# Patient Record
Sex: MALE | Race: BLACK OR AFRICAN AMERICAN | NOT HISPANIC OR LATINO | URBAN - METROPOLITAN AREA
[De-identification: names, ages, dates, MRNs, and addresses within clinical notes are randomized per-mention and may not be internally consistent; named-entity substitution may affect disease eponyms.]

---

## 2021-03-26 ENCOUNTER — IMMUNIZATION (OUTPATIENT)
Dept: PRIMARY CARE CLINIC | Facility: CLINIC | Age: 36
End: 2021-03-26

## 2021-03-26 DIAGNOSIS — Z23 NEED FOR VACCINATION: Primary | ICD-10-CM

## 2021-03-26 PROCEDURE — 91300 PR SARS-COV- 2 COVID-19 VACCINE, NO PRSV, 30MCG/0.3ML, IM: CPT | Mod: S$GLB,,, | Performed by: INTERNAL MEDICINE

## 2021-03-26 PROCEDURE — 0001A PR IMMUNIZ ADMIN, SARS-COV-2 COVID-19 VACC, 30MCG/0.3ML, 1ST DOSE: CPT | Mod: CV19,S$GLB,, | Performed by: INTERNAL MEDICINE

## 2021-03-26 PROCEDURE — 0001A PR IMMUNIZ ADMIN, SARS-COV-2 COVID-19 VACC, 30MCG/0.3ML, 1ST DOSE: ICD-10-PCS | Mod: CV19,S$GLB,, | Performed by: INTERNAL MEDICINE

## 2021-03-26 PROCEDURE — 91300 PR SARS-COV- 2 COVID-19 VACCINE, NO PRSV, 30MCG/0.3ML, IM: ICD-10-PCS | Mod: S$GLB,,, | Performed by: INTERNAL MEDICINE

## 2021-03-26 RX ADMIN — Medication 0.3 ML: at 05:03

## 2021-04-23 ENCOUNTER — IMMUNIZATION (OUTPATIENT)
Dept: PRIMARY CARE CLINIC | Facility: CLINIC | Age: 36
End: 2021-04-23

## 2021-04-23 DIAGNOSIS — Z23 NEED FOR VACCINATION: Primary | ICD-10-CM

## 2021-04-23 PROCEDURE — 91300 PR SARS-COV- 2 COVID-19 VACCINE, NO PRSV, 30MCG/0.3ML, IM: ICD-10-PCS | Mod: S$GLB,,, | Performed by: INTERNAL MEDICINE

## 2021-04-23 PROCEDURE — 91300 PR SARS-COV- 2 COVID-19 VACCINE, NO PRSV, 30MCG/0.3ML, IM: CPT | Mod: S$GLB,,, | Performed by: INTERNAL MEDICINE

## 2021-04-23 PROCEDURE — 0002A PR IMMUNIZ ADMIN, SARS-COV-2 COVID-19 VACC, 30MCG/0.3ML, 2ND DOSE: ICD-10-PCS | Mod: CV19,S$GLB,, | Performed by: INTERNAL MEDICINE

## 2021-04-23 PROCEDURE — 0002A PR IMMUNIZ ADMIN, SARS-COV-2 COVID-19 VACC, 30MCG/0.3ML, 2ND DOSE: CPT | Mod: CV19,S$GLB,, | Performed by: INTERNAL MEDICINE

## 2021-04-23 RX ADMIN — Medication 0.3 ML: at 02:04

## 2022-01-12 ENCOUNTER — HOSPITAL ENCOUNTER (EMERGENCY)
Facility: OTHER | Age: 37
Discharge: HOME | End: 2022-01-12
Attending: EMERGENCY MEDICINE
Payer: MEDICAID

## 2022-01-12 VITALS
RESPIRATION RATE: 18 BRPM | OXYGEN SATURATION: 100 % | DIASTOLIC BLOOD PRESSURE: 74 MMHG | WEIGHT: 145 LBS | HEIGHT: 72 IN | SYSTOLIC BLOOD PRESSURE: 155 MMHG | BODY MASS INDEX: 19.64 KG/M2 | TEMPERATURE: 98 F | HEART RATE: 70 BPM

## 2022-01-12 DIAGNOSIS — S89.92XA INJURY OF LEFT KNEE, INITIAL ENCOUNTER: ICD-10-CM

## 2022-01-12 PROBLEM — S89.90XA KNEE INJURY: Status: ACTIVE | Noted: 2022-01-12

## 2022-01-12 PROCEDURE — 99283 EMERGENCY DEPT VISIT LOW MDM: CPT | Mod: 25

## 2022-01-12 PROCEDURE — 25000003 PHARM REV CODE 250: Performed by: EMERGENCY MEDICINE

## 2022-01-12 RX ORDER — ACETAMINOPHEN 500 MG
1000 TABLET ORAL
Status: COMPLETED | OUTPATIENT
Start: 2022-01-12 | End: 2022-01-12

## 2022-01-12 RX ADMIN — ACETAMINOPHEN 1000 MG: 500 TABLET, FILM COATED ORAL at 11:01

## 2022-01-13 NOTE — ED TRIAGE NOTES
Addended by: TITI VILLALOBOS on: 3/13/2020 06:54 AM     Modules accepted: Orders     Pt c/o Left knee pain/injury. Pain x 3wks. Pt states playing basketball and caused increase in pain

## 2022-01-13 NOTE — ED PROVIDER NOTES
Encounter Date: 1/12/2022    SCRIBE #1 NOTE: I, April Esparza, am scribing for, and in the presence of, Binta Mayer MD.   SCRIBE #2 NOTE: I, Anne Jeb, am scribing for, and in the presence of, Binta Mayer MD.     History     Chief Complaint   Patient presents with    Knee Pain     Pt c/o Left knee pain x3wks     Time seen by provider: 10:45 PM    This is a 36 y.o. male who presents with complaint of left knee pain x 3 weeks. The patient states that this pain began after he was squatting down during his job and felt his knee twist into an uncomfortable position. He reports that his knee feels fine when walking straight. He states that when he was playing basketball earlier today he felt his inner left knee once again twist into an uncomfortable position.The patient was in pain when attempting to bend the left knee. He has not taken any medication for the pain.This is the extent of the patient's complaints at this time.    The history is provided by the patient.     Review of patient's allergies indicates:  No Known Allergies  History reviewed. No pertinent past medical history.  History reviewed. No pertinent surgical history.  History reviewed. No pertinent family history.  Social History     Tobacco Use    Smoking status: Current Every Day Smoker     Packs/day: 0.50    Smokeless tobacco: Never Used   Substance Use Topics    Alcohol use: Never    Drug use: Yes     Frequency: 7.0 times per week     Types: Marijuana     Review of Systems   Constitutional: Negative for activity change, appetite change, chills, diaphoresis and fever.   HENT: Negative for congestion, sore throat and trouble swallowing.    Eyes: Negative for photophobia and visual disturbance.   Respiratory: Negative for cough, chest tightness and shortness of breath.    Cardiovascular: Negative for chest pain.   Gastrointestinal: Negative for abdominal pain, nausea and vomiting.   Endocrine: Negative for polydipsia and polyuria.    Genitourinary: Negative for difficulty urinating and flank pain.   Musculoskeletal: Negative for back pain and neck pain.        Positive for left knee pain.   Skin: Negative for rash.   Neurological: Negative for weakness and headaches.   Psychiatric/Behavioral: Negative for confusion.       Physical Exam     Initial Vitals [01/12/22 2208]   BP Pulse Resp Temp SpO2   (!) 155/74 70 18 97.9 °F (36.6 °C) 100 %      MAP       --         Physical Exam    Nursing note and vitals reviewed.  Constitutional: He appears well-developed. He is cooperative.   HENT:   Head: Atraumatic.   Eyes: Conjunctivae and lids are normal.   Neck:   Normal range of motion.  Cardiovascular: Normal rate.   Musculoskeletal:      Cervical back: Normal range of motion.      Comments: Crepitance with extension of the left knee. Tenderness to proximal tibia on medial joint line. No joint laxity.      Neurological: He is alert.   Skin: No rash noted.   Psychiatric: He has a normal mood and affect. His speech is normal and behavior is normal.         ED Course   Procedures  Labs Reviewed - No data to display       Imaging Results          X-Ray Knee 1 or 2 View Left (Final result)  Result time 01/12/22 23:03:01    Final result by Gurinder Nieto MD (01/12/22 23:03:01)                 Impression:      No acute osseous abnormality identified.      Electronically signed by: Gurinder Nieto MD  Date:    01/12/2022  Time:    23:03             Narrative:    EXAMINATION:  XR KNEE 1 OR 2 VIEW LEFT    CLINICAL HISTORY:  strain/pain;    TECHNIQUE:  AP, lateral views of the left knee were performed.    COMPARISON:  None    FINDINGS:  No evidence of acute displaced fracture, dislocation, or osseous destructive process.  Joint spaces are preserved.  No significant suprapatellar joint effusion.                              X-Rays:   Independently Interpreted Readings:   Other Readings:  Left knee X-ray: Normal alignment. No obvious fracture.    Medications    acetaminophen tablet 1,000 mg (1,000 mg Oral Given 1/12/22 2197)     Medical Decision Making:   History:   Old Medical Records: I decided to obtain old medical records.  Independently Interpreted Test(s):   I have ordered and independently interpreted X-rays - see prior notes.  Clinical Tests:   Radiological Study: Ordered and Reviewed          Scribe Attestation:   Scribe #1: I performed the above scribed service and the documentation accurately describes the services I performed. I attest to the accuracy of the note.  Scribe #2: I performed the above scribed service and the documentation accurately describes the services I performed. I attest to the accuracy of the note.        ED Course as of 01/13/22 0001   Wed Jan 12, 2022   2258 35 yo M here sp recent injury to the L knee, now pain when twisting. Exam with tenderness to medial joint line, crepitance on extension, no joint laxity on varus/valgus stress. Discussed likely ligamentous/menisceal injury with need for immobilization and fu for MRI imaging.    [DM]      ED Course User Index  [DM] Binta Mayer MD          Physician Attestation for Scribe: I, Moreno, reviewed documentation as scribed in my presence, which is both accurate and complete.       Clinical Impression:   Final diagnoses:  [S89.92XA] Injury of left knee, initial encounter          ED Disposition Condition    Discharge Stable        ED Prescriptions     None        Follow-up Information     Follow up With Specialties Details Why Contact Info    [facility] Orthopedic Surgery Schedule an appointment as soon as possible for a visit   [address] [place], [place] [id number] [phone]             Binta Mayer MD  01/13/22 0001

## 2022-02-15 ENCOUNTER — OFFICE VISIT (OUTPATIENT)
Dept: ORTHOPEDICS | Facility: CLINIC | Age: 37
End: 2022-02-15
Payer: MEDICAID

## 2022-02-15 VITALS
DIASTOLIC BLOOD PRESSURE: 76 MMHG | HEIGHT: 72 IN | WEIGHT: 146.25 LBS | SYSTOLIC BLOOD PRESSURE: 120 MMHG | HEART RATE: 60 BPM | BODY MASS INDEX: 19.81 KG/M2

## 2022-02-15 DIAGNOSIS — S89.92XA INJURY OF LEFT KNEE, INITIAL ENCOUNTER: ICD-10-CM

## 2022-02-15 DIAGNOSIS — M62.81 QUADRICEPS WEAKNESS: Primary | ICD-10-CM

## 2022-02-15 DIAGNOSIS — M23.92 INTERNAL DERANGEMENT OF LEFT KNEE: ICD-10-CM

## 2022-02-15 PROCEDURE — 99203 PR OFFICE/OUTPT VISIT, NEW, LEVL III, 30-44 MIN: ICD-10-PCS | Mod: S$PBB,,, | Performed by: PHYSICIAN ASSISTANT

## 2022-02-15 PROCEDURE — 1160F RVW MEDS BY RX/DR IN RCRD: CPT | Mod: CPTII,,, | Performed by: PHYSICIAN ASSISTANT

## 2022-02-15 PROCEDURE — 1160F PR REVIEW ALL MEDS BY PRESCRIBER/CLIN PHARMACIST DOCUMENTED: ICD-10-PCS | Mod: CPTII,,, | Performed by: PHYSICIAN ASSISTANT

## 2022-02-15 PROCEDURE — 3008F PR BODY MASS INDEX (BMI) DOCUMENTED: ICD-10-PCS | Mod: CPTII,,, | Performed by: PHYSICIAN ASSISTANT

## 2022-02-15 PROCEDURE — 99203 OFFICE O/P NEW LOW 30 MIN: CPT | Mod: S$PBB,,, | Performed by: PHYSICIAN ASSISTANT

## 2022-02-15 PROCEDURE — 99999 PR PBB SHADOW E&M-EST. PATIENT-LVL IV: ICD-10-PCS | Mod: PBBFAC,,, | Performed by: PHYSICIAN ASSISTANT

## 2022-02-15 PROCEDURE — 1159F MED LIST DOCD IN RCRD: CPT | Mod: CPTII,,, | Performed by: PHYSICIAN ASSISTANT

## 2022-02-15 PROCEDURE — 99214 OFFICE O/P EST MOD 30 MIN: CPT | Mod: PBBFAC,PN | Performed by: PHYSICIAN ASSISTANT

## 2022-02-15 PROCEDURE — 1159F PR MEDICATION LIST DOCUMENTED IN MEDICAL RECORD: ICD-10-PCS | Mod: CPTII,,, | Performed by: PHYSICIAN ASSISTANT

## 2022-02-15 PROCEDURE — 3078F DIAST BP <80 MM HG: CPT | Mod: CPTII,,, | Performed by: PHYSICIAN ASSISTANT

## 2022-02-15 PROCEDURE — 99999 PR PBB SHADOW E&M-EST. PATIENT-LVL IV: CPT | Mod: PBBFAC,,, | Performed by: PHYSICIAN ASSISTANT

## 2022-02-15 PROCEDURE — 3008F BODY MASS INDEX DOCD: CPT | Mod: CPTII,,, | Performed by: PHYSICIAN ASSISTANT

## 2022-02-15 PROCEDURE — 3078F PR MOST RECENT DIASTOLIC BLOOD PRESSURE < 80 MM HG: ICD-10-PCS | Mod: CPTII,,, | Performed by: PHYSICIAN ASSISTANT

## 2022-02-15 PROCEDURE — 3074F SYST BP LT 130 MM HG: CPT | Mod: CPTII,,, | Performed by: PHYSICIAN ASSISTANT

## 2022-02-15 PROCEDURE — 3074F PR MOST RECENT SYSTOLIC BLOOD PRESSURE < 130 MM HG: ICD-10-PCS | Mod: CPTII,,, | Performed by: PHYSICIAN ASSISTANT

## 2022-02-15 RX ORDER — GUAIFENESIN 600 MG/1
1200 TABLET, EXTENDED RELEASE ORAL
COMMUNITY
Start: 2021-07-21

## 2022-02-15 RX ORDER — IBUPROFEN 800 MG/1
800 TABLET ORAL 3 TIMES DAILY
COMMUNITY
Start: 2022-01-26

## 2022-02-15 RX ORDER — FLUTICASONE PROPIONATE 50 MCG
1 SPRAY, SUSPENSION (ML) NASAL
COMMUNITY
Start: 2021-07-21 | End: 2022-07-21

## 2022-02-15 NOTE — PROGRESS NOTES
Subjective:      Patient ID: Vish Sun is a 36 y.o. male.    Chief Complaint: Pain of the Left Knee      35yo male presents with 3 mon history of left knee pain. States he was working in a manhole. He went to squat while cleaning the manhole and his knee buckled on him. He re injured the knee playing basketball in January of this year. His pain is medial knee. Worse with twisting and lateral movements. States his knee feels like it wants to give out. He has been to the ED multiple times for this. Given a knee immobilizer which helps. Was taking ibuprofen but was causing GI upset.       Review of Systems   Constitutional: Negative for chills and fever.   Cardiovascular: Negative for chest pain.   Respiratory: Negative for cough.    Hematologic/Lymphatic: Does not bruise/bleed easily.   Skin: Negative for poor wound healing and rash.   Musculoskeletal: Positive for joint pain, muscle weakness, myalgias and stiffness. Negative for joint swelling.   Gastrointestinal: Negative for abdominal pain.   Genitourinary: Negative for bladder incontinence.   Neurological: Negative for dizziness, loss of balance and weakness.   Psychiatric/Behavioral: Negative for altered mental status.       Review of patient's allergies indicates:  No Known Allergies     Current Outpatient Medications   Medication Sig Dispense Refill    fluticasone propionate (FLONASE) 50 mcg/actuation nasal spray 1 spray by Nasal route.      guaiFENesin (MUCINEX) 600 mg 12 hr tablet Take 1,200 mg by mouth.      ibuprofen (ADVIL,MOTRIN) 800 MG tablet Take 800 mg by mouth 3 (three) times daily.       No current facility-administered medications for this visit.        The patient's relevant past medical, surgical, and social history was reviewed in Epic.       Objective:      VITAL SIGNS: /76   Pulse 60   Ht 6' (1.829 m)   Wt 66.3 kg (146 lb 4.4 oz)   BMI 19.84 kg/m²     General    Nursing note and vitals reviewed.  Constitutional: He is oriented to  person, place, and time. He appears well-developed and well-nourished.   Neurological: He is alert and oriented to person, place, and time.     General Musculoskeletal Exam   Gait: antalgic       Right Knee Exam     Range of Motion   Extension: 5   Flexion: 120     Left Knee Exam     Tenderness   The patient tender to palpation of the medial joint line and patella.    Range of Motion   Extension: 5   Flexion: 110     Tests   Meniscus   Salud:  Medial - positive     Muscle Strength   Right Lower Extremity   Quadriceps:  4/5   Left Lower Extremity   Quadriceps:  3/5     Vascular Exam       Left Pulses  Dorsalis Pedis:      2+  Posterior Tibial:      1+         X-Ray Knee 1 or 2 View Left  Narrative: EXAMINATION:  XR KNEE 1 OR 2 VIEW LEFT    CLINICAL HISTORY:  strain/pain;    TECHNIQUE:  AP, lateral views of the left knee were performed.    COMPARISON:  None    FINDINGS:  No evidence of acute displaced fracture, dislocation, or osseous destructive process.  Joint spaces are preserved.  No significant suprapatellar joint effusion.  Impression: No acute osseous abnormality identified.    Electronically signed by: Gurinder Nieto MD  Date:    01/12/2022  Time:    23:03    I have reviewed the above radiograph and agree with the findings stated by the radiologist.         Assessment:       1. Quadriceps weakness    2. Injury of left knee, initial encounter    3. Internal derangement of left knee          Plan:         Vish was seen today for pain.    Diagnoses and all orders for this visit:    Quadriceps weakness  -     Ambulatory referral/consult to Physical/Occupational Therapy; Future  -     KNEE BRACE FOR HOME USE    Injury of left knee, initial encounter  -     Ambulatory referral/consult to Sports Medicine  -     Ambulatory referral/consult to Physical/Occupational Therapy; Future  -     KNEE BRACE FOR HOME USE    Internal derangement of left knee  -     Ambulatory referral/consult to Physical/Occupational Therapy;  Future  -     KNEE BRACE FOR HOME USE      Left knee injury, probable medial meniscus injury. Explained to the patient he will need a left knee MRI but will need to complete a course of PT first in order for the MRI to be approved. [place]. Knee brace given today. Tylenol for pain. He may call the office after 4-5 weeks of PT if no relief. Will order MRI then.   He understands.         Kerri Evangelista PA-C   02/16/2022

## 2022-02-17 ENCOUNTER — CLINICAL SUPPORT (OUTPATIENT)
Dept: REHABILITATION | Facility: HOSPITAL | Age: 37
End: 2022-02-17
Attending: PHYSICIAN ASSISTANT
Payer: MEDICAID

## 2022-02-17 DIAGNOSIS — R29.898 WEAKNESS OF LEFT LOWER EXTREMITY: ICD-10-CM

## 2022-02-17 DIAGNOSIS — M25.662 DECREASED RANGE OF MOTION (ROM) OF LEFT KNEE: ICD-10-CM

## 2022-02-17 DIAGNOSIS — M62.81 QUADRICEPS WEAKNESS: ICD-10-CM

## 2022-02-17 DIAGNOSIS — M23.92 INTERNAL DERANGEMENT OF LEFT KNEE: ICD-10-CM

## 2022-02-17 DIAGNOSIS — S89.92XA INJURY OF LEFT KNEE, INITIAL ENCOUNTER: ICD-10-CM

## 2022-02-17 PROCEDURE — 97161 PT EVAL LOW COMPLEX 20 MIN: CPT | Mod: PN | Performed by: PHYSICAL THERAPIST

## 2022-02-17 PROCEDURE — 97110 THERAPEUTIC EXERCISES: CPT | Mod: PN | Performed by: PHYSICAL THERAPIST

## 2022-02-17 NOTE — PLAN OF CARE
[facility]   Physical Therapy Initial Evaluation     Date: 2/17/2022   Name: Vish Sun  Clinic Number: [id number]    Therapy Diagnosis:   Encounter Diagnoses   Name Primary?    Injury of left knee, initial encounter     Quadriceps weakness     Internal derangement of left knee     Weakness of left lower extremity Yes    Decreased range of motion (ROM) of left knee      Referring Provider: Kerri Evangelista PA-C    Referring Provider Orders: PT eval and treat  Medical Diagnosis from Referral: Injury of left knee, initial encounter [S89.92XA], Quadriceps weakness [M62.81], Internal derangement of left knee [M23.92]  Evaluation Date: 2/17/2022  Authorization Period Expiration: 2/15/2023  Plan of Care Expiration: 5/17/2022  Progress Note Due: 4/17/2022  Visit # / Visits authorized: 1/pending   FOTO: 1/3 (2/17/2022)    Precautions: Standard     Time In: 12:30  Time Out: 13:15  Total Appointment Time (timed & untimed codes): 45 minutes    SUBJECTIVE     Date of onset: 3 months ago  History of current condition - Vish reports 3 month history of left knee pain. States he was working in a manhole, then he went to squat while cleaning the manhole and his knee buckled on him. He re-injured the knee playing basketball in January of this year. His pain is on the medial aspect of the knee, worse with twisting and lateral movements. States his knee feels like it wants to give out. Given a knee immobilizer which helps. Was taking ibuprofen but was causing GI upset.     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    Surgical History:   Vish Sun  has no past surgical history on file.    Medications:   Vish has a current medication list which includes the following prescription(s): fluticasone propionate, guaifenesin, and ibuprofen.    Allergies:   Review of patient's allergies indicates:  No Known Allergies     OBJECTIVE     Palpation: Tenderness to palpation of L adductors, medial joint line, quadriceps (increased tone)  Transfers: decreased weightbearing through L leg with transferring sit to stand  Gait: mild L leg antalgia, slow amelia    Lower Extremity Strength 2/17/2022   R hip flexion 4+/5   R hip external rotation 4+/5   R knee flexion 5/5   R knee extension 5/5   L hip flexion 4-/5   L hip external rotation 4-/5   L knee flexion 4-/5   L knee extension 3+/5     Knee ROM 2/17/22   R extension WNL   R flexion WNL   L extension lacking 5°   L flexion 85°     Special Testing R L   Salud Test - positive   Valgus Stress Test - negative   Varus Stress Test - negative   Noble's Test - negative     Limitation/Restriction for FOTO Knee Survey    Therapist reviewed FOTO scores for Vish Sun on 2/17/2022.   FOTO documents entered into [employer] - see Media section.    Limitation Score: 49% impairment     TREATMENT     Total Treatment time (time-based codes) separate from Evaluation: 20 minutes    Vish received the treatments listed below:      Therapeutic exercises to develop strength, endurance for 20 minutes -  [x] SciFit stepper at level 2, 8 minutes  [x] Supine heel slide (L), x20  [x] Seated hip adduction isometric with pillow, x10  [x] Seated long-arc quad (L), x15  [x] HEP  review    PATIENT EDUCATION AND HOME EXERCISES     Education provided: knee anatomy & surrounding musculature, meaning of special tests, L knee range of motion norms, etiology & conservative care for meniscus tear, pt prognosis, PT plan of care    Written Home Exercises Provided: yes. Exercises were reviewed and Vish was able to demonstrate them prior to the end of the session.  Vish demonstrated good understanding of the education provided. See EMR under Patient Instructions for exercises provided during therapy sessions.    ASSESSMENT     Vish is a 36 y.o. male referred to outpatient Physical Therapy with a medical diagnosis of L knee pain, quadriceps weakness, and internal derangement of L knee. Patient presents with deficits to L knee range of motion and L leg strength, balance, transfer ability, and gait quality. His presentation appears consistent with medial meniscus tear, with associated soft tissue dysfunction. Symptoms inhibit his ability to walk, tolerate prolonged positions, roll over in bed, and perform other functional mobility.    Patient prognosis is Good.   Patient will benefit from skilled outpatient Physical Therapy to address the deficits stated above and in the chart below, provide patient /family education, and to maximize patient's level of independence.     Plan of care discussed with patient: yes  Patient's spiritual, cultural and educational needs considered and patient is agreeable to the plan of care and goals as stated below:     Anticipated Barriers for therapy: none    Medical Necessity is demonstrated by the following  History  Co-morbidities and personal factors that may impact the plan of care Co-morbidities:   none    Personal Factors:   no deficits     low   Examination  Body Structures and Functions, activity limitations and participation restrictions that may impact the plan of care Body Regions:   lower extremities    Body Systems:     ROM  strength  balance  gait  transfers    Participation Restrictions:   prolonged walking, side-lying on R side, rolling over in bed, stair negotiation tolerating sustained positions    Activity limitations:   Learning and applying knowledge  no deficits    General Tasks and Commands  no deficits    Communication  no deficits    Mobility  walking    Self care  no deficits    Domestic Life  no deficits    Interactions/Relationships  no deficits    Life Areas  no deficits    Community and Social Life  no deficits         low   Clinical Presentation stable and uncomplicated low   Decision Making/ Complexity Score: low       GOALS  Short Term Goals (4 weeks)  Pt will report pain of no more than 4/10 in the L knee with regular activity  Pt will demonstrate L knee and hip strength of at least 4/5 for improved support of the knee with functional mobility  Pt will demonstrate L knee extension range of motion deficit of no more than 2 degrees  Pt will demonstrate L knee flexion range of motion of at least 100 degrees  Pt will be independent with introductory HEP    Long Term Goals (8 weeks)  Pt will report pain of no more than 2/10 in the L knee with regular activity  Pt will demonstrate L knee and hip strength of at least 4+/5 for improved support of the knee with functional mobility  Pt will demonstrate L knee ROM WFL for improved positional tolerance and functional mobility  Pt will score at least 29% on FOTO to demonstrate overall functional improvement  Pt will be independent with comprehensive HEP      PLAN     Plan of Care Certification: 2/17/2022 to 5/17/2022    Outpatient Physical Therapy: 2 times weekly for 8 weeks to include the following interventions - Therapeutic Exercise, Manual Therapy, Therapeutic Activity, Neuromuscular Re-education, Electrical Stimulation (Unattended)    Jocelin Gómez, PT, DPT        I CERTIFY THE NEED FOR THESE SERVICES FURNISHED UNDER THIS PLAN OF TREATMENT AND WHILE UNDER MY CARE    Physician's comments:     Physician's Signature: ___________________________________________________

## 2022-02-24 ENCOUNTER — CLINICAL SUPPORT (OUTPATIENT)
Dept: REHABILITATION | Facility: HOSPITAL | Age: 37
End: 2022-02-24
Attending: PHYSICIAN ASSISTANT
Payer: MEDICAID

## 2022-02-24 DIAGNOSIS — R29.898 WEAKNESS OF LEFT LOWER EXTREMITY: Primary | ICD-10-CM

## 2022-02-24 DIAGNOSIS — M25.662 DECREASED RANGE OF MOTION (ROM) OF LEFT KNEE: ICD-10-CM

## 2022-02-24 PROCEDURE — 97110 THERAPEUTIC EXERCISES: CPT | Mod: PN | Performed by: PHYSICAL THERAPIST

## 2022-02-24 NOTE — PROGRESS NOTES
[facility]   Physical Therapy Treatment Note     Name: Vish Sun  Clinic Number: [id number]    Therapy Diagnosis:   Encounter Diagnoses   Name Primary?    Weakness of left lower extremity Yes    Decreased range of motion (ROM) of left knee      Referring Provider: Kerri Evangelista PA-C    Visit Date: 2/24/2022    Referring Provider Orders: PT eval and treat  Medical Diagnosis from Referral: Injury of left knee, initial encounter [S89.92XA], Quadriceps weakness [M62.81], Internal derangement of left knee [M23.92]  Evaluation Date: 2/17/2022  Authorization Period Expiration: 2/15/2023  Plan of Care Expiration: 5/17/2022  Progress Note Due: 4/17/2022  Visit # / Visits authorized: 2/20  FOTO: 1/3 (2/17/2022)    PTA Visit #: 0/5     Time In: 12:30  Time Out: 13:12  Total Billable Time: 42 minutes    SUBJECTIVE     Vish reports lateral knee pain today, which he attributes to prolonged sitting while driving.  He was somewhat compliant with home exercise program.  Response to previous treatment: no adverse effect  Functional change: no significant change    Pain: moderate  Location: L knee    OBJECTIVE     Objective Measures updated at progress report unless specified.     Treatment     Vish received the treatments listed below:      Therapeutic exercises to develop strength, endurance and core stabilization for 40 minutes -  [x]? SciFit stepper at level 4, 8 minutes  [x]? Short arc quad (2#, L), 3x10  [x]? Hooklying hip adduction isometric with ball, x30  [x]? Side-lying hip abduction (2#, L), 2x8   [x] Straight leg raise (L), 2x8  [x] Seated clam with blue band, x30  [x] Seated marches (R&L), x30  [x] Standing hamstring curl (2#, L), 2x20  [x] Side-stepping with orange band around knees, 2 minutes   [x] Standing heel raises (B), 2x10  [x] Standing calf stretch (B), 2x1 minute  [x] Sit to stand from chair + foam pad and with light hand support, 2x10  [x] Lateral step up/down on 4-inch  step (R&L), x8  [x]? HEP review    Patient Education and Home Exercises     Education provided: knee muscle anatomy, relationship between hip and knee, pt prognosis, PT plan of care    Written Home Exercises Provided: Patient instructed to cont prior HEP. Exercises were reviewed, and Vish was able to demonstrate them prior to the end of the session, as needed. Vish demonstrated good understanding of the education provided. See EMR under Patient Instructions for exercises provided during therapy sessions.    Home Exercise Program: long arc quads, hip adduction isometric with pillow, heel slides    ASSESSMENT     Pt tolerated treatment session well, with mild (temporary) increased symptoms with exercise and improving exercise technique within session. Pt requires verbal and tactile cuing for correct exercise performance. Pt's functional mobility and ability to perform ADLs still limited by L lower extremity pain and weakness.    Vish will continue to benefit from skilled outpatient physical therapy to address the deficits listed in the problem list box on initial evaluation, provide pt/family education and to maximize pt's level of independence in the home and community environment.     Vish is progressing well towards his goals.   Pt prognosis: Good.   Pt's spiritual, cultural and educational needs considered, and pt agreeable to plan of care and goals.  Anticipated barriers to physical therapy: none    GOALS  Short Term Goals (4 weeks)  Pt will report pain of no more than 4/10 in the L knee with regular activity  NOT MET  Pt will demonstrate L knee and hip strength of at least 4/5 for improved support of the knee with functional mobility  NOT MET  Pt will demonstrate L knee extension range of motion deficit of no more than 2 degrees  NOT MET  Pt will demonstrate L knee flexion range of motion of at least 100 degrees  NOT MET  Pt will be independent with introductory HEP  PARTIALLY MET     Long Term Goals (8  weeks)  Pt will report pain of no more than 2/10 in the L knee with regular activity  NOT MET  Pt will demonstrate L knee and hip strength of at least 4+/5 for improved support of the knee with functional mobility  NOT MET  Pt will demonstrate L knee ROM WFL for improved positional tolerance and functional mobility  NOT MET  Pt will score at least 29% on FOTO to demonstrate overall functional improvement  NOT MET  Pt will be independent with comprehensive HEP  NOT MET      PLAN     Continue per Plan of Care    Jocelin Gómez, PT, DPT

## 2022-03-02 NOTE — PROGRESS NOTES
[facility]   Physical Therapy Treatment Note     Name: Vish Sun  Clinic Number: [id number]    Therapy Diagnosis:   Encounter Diagnoses   Name Primary?    Weakness of left lower extremity Yes    Decreased range of motion (ROM) of left knee      Referring Provider: Kerri Evangelista PA-C    Visit Date: 3/3/2022    Referring Provider Orders: PT eval and treat  Medical Diagnosis from Referral: Injury of left knee, initial encounter [S89.92XA], Quadriceps weakness [M62.81], Internal derangement of left knee [M23.92]  Evaluation Date: 2/17/2022  Authorization Period Expiration: 2/15/2023  Plan of Care Expiration: 5/17/2022  Progress Note Due: 4/17/2022  Visit # / Visits authorized: 3/20  FOTO: 1/3 (2/17/2022)    PTA Visit #: 0/5     Time In: 14:50  Time Out: 15:30  Total Billable Time: 38 minutes    SUBJECTIVE     Vish reports overall improved ability to use his L knee, but he has been able to walk more without the brace. His knee feels a little less tight.  He was somewhat compliant with home exercise program.  Response to previous treatment: no adverse effect  Functional change: no significant change    Pain: moderate  Location: L knee    OBJECTIVE     Objective Measures updated at progress report unless specified.     Treatment     Vish received the treatments listed below:      Therapeutic exercises to develop strength, endurance and core stabilization for 38 minutes -  [x]? SciFit stepper at level 4, 6 minutes  [x]? Short arc quad (3#, L), 3x15  []? Hooklying hip adduction isometric with ball, x30  [x]? Side-lying hip abduction (3#, L), 2x10   [x] Straight leg raise (L), 3x10  [x] Supine clam with blue band, 3x10  [x] Standing marches (3#, L), 3x10  [x] Standing hamstring curl (3#, L), 3x10  [x] Side-stepping with green band around knees, 2 minutes   [x] Standing heel raises (B), x20  [x] Standing calf stretch (B), 2x1 minute   [x] Sit to stand from chair + foam pad and with light  hand support, 3x10  [x] Lateral step up/down on 4-inch step (R&L), 3x10   [x] Narrow base of support on foam pad with eyes closed, 30 seconds  [x] Tandem balance on foam pad (R/L & L/R), 30 seconds  [x]? HEP review    Patient Education and Home Exercises     Education provided: knee muscle anatomy, relationship between hip and knee, pt prognosis, PT plan of care    Written Home Exercises Provided: Patient instructed to cont prior HEP. Exercises were reviewed, and Vish was able to demonstrate them prior to the end of the session, as needed. Vish demonstrated good understanding of the education provided. See EMR under Patient Instructions for exercises provided during therapy sessions.    Home Exercise Program: long arc quads, hip adduction isometric with pillow, heel slides  Updated Home Exercise Program: hip adduction isometric with pillow, straight leg raise    ASSESSMENT     Pt tolerated treatment session well, with mild (temporary) increased symptoms with exercise and improving exercise technique within session. Pt requires verbal and tactile cuing for correct exercise performance. Pt's functional mobility and ability to perform ADLs still limited by L lower extremity pain and weakness.    Vish will continue to benefit from skilled outpatient physical therapy to address the deficits listed in the problem list box on initial evaluation, provide pt/family education and to maximize pt's level of independence in the home and community environment.     Vish is progressing well towards his goals.   Pt prognosis: Good  Pt's spiritual, cultural and educational needs considered, and pt agreeable to plan of care and goals.  Anticipated barriers to physical therapy: none    GOALS  Short Term Goals (4 weeks)  Pt will report pain of no more than 4/10 in the L knee with regular activity  NOT MET  Pt will demonstrate L knee and hip strength of at least 4/5 for improved support of the knee with functional mobility  NOT  MET  Pt will demonstrate L knee extension range of motion deficit of no more than 2 degrees  NOT MET  Pt will demonstrate L knee flexion range of motion of at least 100 degrees  NOT MET  Pt will be independent with introductory HEP  PARTIALLY MET     Long Term Goals (8 weeks)  Pt will report pain of no more than 2/10 in the L knee with regular activity  NOT MET  Pt will demonstrate L knee and hip strength of at least 4+/5 for improved support of the knee with functional mobility  NOT MET  Pt will demonstrate L knee ROM WFL for improved positional tolerance and functional mobility  NOT MET  Pt will score at least 29% on FOTO to demonstrate overall functional improvement  NOT MET  Pt will be independent with comprehensive HEP  NOT MET      PLAN     Continue per Plan of Care    Jocelin Gómez, PT, DPT

## 2022-03-03 ENCOUNTER — CLINICAL SUPPORT (OUTPATIENT)
Dept: REHABILITATION | Facility: HOSPITAL | Age: 37
End: 2022-03-03
Attending: PHYSICIAN ASSISTANT
Payer: MEDICAID

## 2022-03-03 DIAGNOSIS — M25.662 DECREASED RANGE OF MOTION (ROM) OF LEFT KNEE: ICD-10-CM

## 2022-03-03 DIAGNOSIS — R29.898 WEAKNESS OF LEFT LOWER EXTREMITY: Primary | ICD-10-CM

## 2022-03-03 PROCEDURE — 97110 THERAPEUTIC EXERCISES: CPT | Mod: PN | Performed by: PHYSICAL THERAPIST

## 2022-03-08 ENCOUNTER — CLINICAL SUPPORT (OUTPATIENT)
Dept: REHABILITATION | Facility: HOSPITAL | Age: 37
End: 2022-03-08
Attending: PHYSICIAN ASSISTANT
Payer: MEDICAID

## 2022-03-08 DIAGNOSIS — M25.662 DECREASED RANGE OF MOTION (ROM) OF LEFT KNEE: ICD-10-CM

## 2022-03-08 DIAGNOSIS — R29.898 WEAKNESS OF LEFT LOWER EXTREMITY: Primary | ICD-10-CM

## 2022-03-08 PROCEDURE — 97110 THERAPEUTIC EXERCISES: CPT | Mod: PN,CQ

## 2022-03-08 NOTE — PROGRESS NOTES
[facility]   Physical Therapy Treatment Note     Name: Vish Sun  Clinic Number: [id number]    Therapy Diagnosis:   Encounter Diagnoses   Name Primary?    Weakness of left lower extremity Yes    Decreased range of motion (ROM) of left knee      Referring Provider: Kerri Evangelista PA-C    Visit Date: 3/8/2022    Referring Provider Orders: PT eval and treat  Medical Diagnosis from Referral: Injury of left knee, initial encounter [S89.92XA], Quadriceps weakness [M62.81], Internal derangement of left knee [M23.92]  Evaluation Date: 2/17/2022  Authorization Period Expiration: 2/15/2023  Plan of Care Expiration: 5/17/2022  Progress Note Due: 4/17/2022  Visit # / Visits authorized: 4/20  FOTO: 1/3 (2/17/2022)    PTA Visit #: 1/5     Time In: 12:30  Time Out: 13:15  Total Billable Time: 45 minutes    SUBJECTIVE     Patient states that he is no better no worse.  Reports that his pain/discomfort that comes and goes.      He was somewhat compliant with home exercise program.  Response to previous treatment: no reported change  Functional change: no significant change    Pain: 6/10  Location: L knee    OBJECTIVE     Objective Measures updated at progress report unless specified.     Treatment     Vish received the treatments listed below:      Therapeutic exercises to develop strength, endurance and core stabilization for 38 minutes -  [x]? SciFit stepper at level 4, 6 minutes  []? Short arc quad (3#, L), 3x15  []? Hooklying hip adduction isometric with ball, x30  [x]? Side-lying hip abduction (3#, L), 2x10   [x] Straight leg raise (L), 3x10  [x] Supine clam with blue band, 3x10  [x] Standing marches (3#, L), 3x10  [x] Standing hamstring curl (3#, L), 3x10  [x] Side-stepping with green band around knees, 3 minutes   [x] Standing heel raises (B), 2x20  [x] Standing calf stretch (B), 2x1 minute   [x] Sit to stand from chair + foam pad and with light hand support, 3x12  [x] Lateral step up/down  on 4-inch step (R&L), 3x12   [x] Narrow base of support on foam pad with eyes closed, 30 seconds  [x] Tandem balance on foam pad (R/L & L/R), 30 seconds  []? HEP review    Patient Education and Home Exercises     Education provided:     Complete all exercise and acitivities of daily living in pain free range    Written Home Exercises Provided: Patient instructed to cont prior HEP. Exercises were reviewed, and Vish was able to demonstrate them prior to the end of the session, as needed. Vish demonstrated good understanding of the education provided. See EMR under Patient Instructions for exercises provided during therapy sessions.    Home Exercise Program: long arc quads, hip adduction isometric with pillow, heel slides  Updated Home Exercise Program: hip adduction isometric with pillow, straight leg raise    ASSESSMENT     Patinet improving with tolerance to exercise and able to progress well with reps.  Patient only required a few verbal corrections for proper technique and sequencing with exercise.     Vish will continue to benefit from skilled outpatient physical therapy to address the deficits listed in the problem list box on initial evaluation, provide pt/family education and to maximize pt's level of independence in the home and community environment.     Vish is progressing well towards his goals.   Pt prognosis: Good  Pt's spiritual, cultural and educational needs considered, and pt agreeable to plan of care and goals.  Anticipated barriers to physical therapy: none    GOALS  Short Term Goals (4 weeks)  Pt will report pain of no more than 4/10 in the L knee with regular activity  NOT MET  Pt will demonstrate L knee and hip strength of at least 4/5 for improved support of the knee with functional mobility  NOT MET  Pt will demonstrate L knee extension range of motion deficit of no more than 2 degrees  NOT MET  Pt will demonstrate L knee flexion range of motion of at least 100 degrees  NOT MET  Pt will be  independent with introductory HEP  PARTIALLY MET     Long Term Goals (8 weeks)  Pt will report pain of no more than 2/10 in the L knee with regular activity  NOT MET  Pt will demonstrate L knee and hip strength of at least 4+/5 for improved support of the knee with functional mobility  NOT MET  Pt will demonstrate L knee ROM WFL for improved positional tolerance and functional mobility  NOT MET  Pt will score at least 29% on FOTO to demonstrate overall functional improvement  NOT MET  Pt will be independent with comprehensive HEP  NOT MET      PLAN     Continue per Plan of Care    Varun Booth, TARYN

## 2022-03-15 ENCOUNTER — CLINICAL SUPPORT (OUTPATIENT)
Dept: REHABILITATION | Facility: HOSPITAL | Age: 37
End: 2022-03-15
Attending: PHYSICIAN ASSISTANT
Payer: MEDICAID

## 2022-03-15 DIAGNOSIS — R29.898 WEAKNESS OF LEFT LOWER EXTREMITY: Primary | ICD-10-CM

## 2022-03-15 DIAGNOSIS — M25.662 DECREASED RANGE OF MOTION (ROM) OF LEFT KNEE: ICD-10-CM

## 2022-03-15 PROCEDURE — 97110 THERAPEUTIC EXERCISES: CPT | Mod: PN,CQ

## 2022-03-15 NOTE — PROGRESS NOTES
[facility]   Physical Therapy Treatment Note     Name: Vish Sun  Clinic Number: [id number]    Therapy Diagnosis:   Encounter Diagnoses   Name Primary?    Weakness of left lower extremity Yes    Decreased range of motion (ROM) of left knee      Referring Provider: Kerri Evangelista PA-C    Visit Date: 3/15/2022    Referring Provider Orders: PT eval and treat  Medical Diagnosis from Referral: Injury of left knee, initial encounter [S89.92XA], Quadriceps weakness [M62.81], Internal derangement of left knee [M23.92]  Evaluation Date: 2/17/2022  Authorization Period Expiration: 2/15/2023  Plan of Care Expiration: 5/17/2022  Progress Note Due: 4/17/2022  Visit # / Visits authorized: 5/20  FOTO: 1/3 (2/17/2022)    PTA Visit #: 2/5     Time In: 1400  Time Out: 1445  Total Billable Time: 45 minutes    SUBJECTIVE     Patient states that he is no better no worse.  Reports that his pain/discomfort that comes and goes.      He was somewhat compliant with home exercise program.  Response to previous treatment: decreased symptoms  Functional change: no significant change    Pain: 0/10  Location: L knee    OBJECTIVE     Objective Measures updated at progress report unless specified.     Treatment     Vish received the treatments listed below:      Therapeutic exercises to develop strength, endurance and core stabilization for 38 minutes -  [x]? SciFit stepper at level 4, 5 minutes  [x]? Short arc quad (3#, L), 3x10  []? Hooklying hip adduction isometric with ball, x30  [x]? Side-lying hip abduction (3#, L), 2x10   [x] Straight leg raise (L), 3x12  [] Supine clam with blue band, 3x10  [x] Standing marches (4#, L), 3x10  [x] Standing hamstring curl (3#, L), 3x10  [x] Side-stepping with green band around knees, 3 minutes   [x] Standing heel raises (B), 3x15  [x] Standing calf stretch (B), 3x1 minute   [x] Sit to stand from chair + foam pad and with light hand support, 3x15  [x] Lateral step up/down on  4-inch step (R&L), 3x15   [] Narrow base of support on foam pad with eyes closed, 30 seconds  [] Tandem balance on foam pad (R/L & L/R), 30 seconds  [x]? LAQ 5sec hold 10x    Patient Education and Home Exercises     Education provided:     Complete all exercise and acitivities of daily living in pain free range    Written Home Exercises Provided: Patient instructed to cont prior HEP. Exercises were reviewed, and Vish was able to demonstrate them prior to the end of the session, as needed. Vish demonstrated good understanding of the education provided. See EMR under Patient Instructions for exercises provided during therapy sessions.    Home Exercise Program: long arc quads, hip adduction isometric with pillow, heel slides  Updated Home Exercise Program: hip adduction isometric with pillow, straight leg raise    ASSESSMENT     Patinet improving with tolerance to exercise and able to progress well with reps and intensity.  Patient improving with Qaud control    Vish will continue to benefit from skilled outpatient physical therapy to address the deficits listed in the problem list box on initial evaluation, provide pt/family education and to maximize pt's level of independence in the home and community environment.     Vish is progressing well towards his goals.   Pt prognosis: Good  Pt's spiritual, cultural and educational needs considered, and pt agreeable to plan of care and goals.  Anticipated barriers to physical therapy: none    GOALS  Short Term Goals (4 weeks)  Pt will report pain of no more than 4/10 in the L knee with regular activity  NOT MET  Pt will demonstrate L knee and hip strength of at least 4/5 for improved support of the knee with functional mobility  NOT MET  Pt will demonstrate L knee extension range of motion deficit of no more than 2 degrees  NOT MET  Pt will demonstrate L knee flexion range of motion of at least 100 degrees  NOT MET  Pt will be independent with introductory HEP  PARTIALLY  MET     Long Term Goals (8 weeks)  Pt will report pain of no more than 2/10 in the L knee with regular activity  NOT MET  Pt will demonstrate L knee and hip strength of at least 4+/5 for improved support of the knee with functional mobility  NOT MET  Pt will demonstrate L knee ROM WFL for improved positional tolerance and functional mobility  NOT MET  Pt will score at least 29% on FOTO to demonstrate overall functional improvement  NOT MET  Pt will be independent with comprehensive HEP  NOT MET      PLAN     Continue per Plan of Care    Varun Booth PTA

## 2022-03-17 ENCOUNTER — CLINICAL SUPPORT (OUTPATIENT)
Dept: REHABILITATION | Facility: HOSPITAL | Age: 37
End: 2022-03-17
Attending: PHYSICIAN ASSISTANT
Payer: MEDICAID

## 2022-03-17 DIAGNOSIS — M25.662 DECREASED RANGE OF MOTION (ROM) OF LEFT KNEE: ICD-10-CM

## 2022-03-17 DIAGNOSIS — R29.898 WEAKNESS OF LEFT LOWER EXTREMITY: Primary | ICD-10-CM

## 2022-03-17 PROCEDURE — 97110 THERAPEUTIC EXERCISES: CPT | Mod: PN | Performed by: PHYSICAL THERAPIST

## 2022-03-17 NOTE — PROGRESS NOTES
[facility]   Physical Therapy Treatment Note     Name: Vish Sun  Clinic Number: [id number]    Therapy Diagnosis:   Encounter Diagnoses   Name Primary?    Weakness of left lower extremity Yes    Decreased range of motion (ROM) of left knee      Referring Provider: Kerri Evangelista PA-C    Visit Date: 3/17/2022    Referring Provider Orders: PT eval and treat  Medical Diagnosis from Referral: Injury of left knee, initial encounter [S89.92XA], Quadriceps weakness [M62.81], Internal derangement of left knee [M23.92]  Evaluation Date: 2/17/2022  Last Reassessment: 3/17/2022  Authorization Period Expiration: 5/17/2022  Plan of Care Expiration: 6/17/2022  Progress Note Due: 5/17/2022  Visit # / Visits authorized: 6/20  FOTO: 2/3 (2/17/2022, 3/17/2022)    PTA Visit #: 0/5     Time In: 14:05  Time Out: 14:45  Total Billable Time: 40 minutes    SUBJECTIVE     Patient states that his knee is a little better since starting physical therapy. Reports that his pain/discomfort that comes and goes. He cannot tolerate twisting motions or play basketball, and he notes continued discomfort with prolonged sitting for work.    He was somewhat compliant with home exercise program - he performs 15 minutes of exercises from therapy twice a week  Response to previous treatment: decreased symptoms  Functional change: no significant change    Pain: 4/10  Location: L knee    OBJECTIVE     Objective Measures updated at progress report unless specified.     PT/PTA met face to face to discuss pt's treatment plan and progress towards established goals. Pt will be seen by a physical therapist minimally every 6th visit or every 30 days.    Treatment     Vish received the treatments listed below:      Therapeutic exercises to develop strength, endurance and core stabilization for 40 minutes -  [x]? SciFit stepper at level 6, 8 minutes  [x]? Short arc quad (5#, L), 3x10  [x] Straight leg raise (L), x15  [x]? Hooklying  hip adduction isometric with ball, x30  [x]? Side-lying hip abduction (3#, L), 3x10   [] Supine clam with blue band, 3x10  [x] Bridging with belt around knees, 4x10  [x] Prone hamstring curls (5#, L), 3x10  [x] Standing marches (4#, L), 3x10  [x] Standing hamstring curl (4#, L), 3x10  [x] Side-stepping with green band around knees, 3 minutes   [] Standing heel raises (B), 3x15  [] Standing calf stretch (B), 3x1 minute   [] Sit to stand from chair + foam pad and with light hand support, 3x15  [] Lateral step up/down on 4-inch step (R&L), 3x15   [] Narrow base of support on foam pad with eyes closed, 30 seconds  [] Tandem balance on foam pad (R/L & L/R), 30 seconds  [x] Single-leg balance (R&L), 2x20 seconds  [x] BOSU step up (L), x20  [x] HEP review     Limitation/Restriction for FOTO Knee Survey     Therapist reviewed FOTO scores for Vish Sun on 3/17/2022.   FOTO documents entered into [employer] - see Media section.     Limitation Score:   2/17/22: 49% impairment  3/17/22: 50% impairment       Patient Education and Home Exercises     Education provided: Complete all exercise and acitivities of daily living in pain-free range, function of meniscus in dynamic leg movements    Written Home Exercises Provided: Patient instructed to cont prior HEP. Exercises were reviewed, and Vish was able to demonstrate them prior to the end of the session, as needed. Vish demonstrated good understanding of the education provided. See EMR under Patient Instructions for exercises provided during therapy sessions.    Home Exercise Program: hip adduction isometric with pillow, straight leg raise  Updated Home Exercise Program: straight leg raise (streamlined to improve compliance)    ASSESSMENT     Pt tolerated treatment session well, with no increased symptoms with exercise. He is demonstrating improved functional strength of the L leg, as well as increased tolerance for weight-bearing on the L (now able to perform single-leg balance).  He still experiences pain and difficulty with more dynamic movements, consistent with suspected meniscal tear, but this could improve with more progressive resistance exercise and functional retraining.     Vish will continue to benefit from skilled outpatient physical therapy to address the deficits listed in the problem list box on initial evaluation, provide pt/family education and to maximize pt's level of independence in the home and community environment.     Vish is progressing well towards his goals.   Pt prognosis: Good  Pt's spiritual, cultural and educational needs considered, and pt agreeable to plan of care and goals.  Anticipated barriers to physical therapy: none    GOALS  Short Term Goals (4 weeks)  Pt will report pain of no more than 4/10 in the L knee with regular activity  PARTIALLY MET  Pt will demonstrate L knee and hip strength of at least 4/5 for improved support of the knee with functional mobility  PARTIALLY MET  Pt will demonstrate L knee extension range of motion deficit of no more than 2 degrees  MET  Pt will demonstrate L knee flexion range of motion of at least 100 degrees  MET  Pt will be independent with introductory HEP  PARTIALLY MET     Long Term Goals (8 weeks)  Pt will report pain of no more than 2/10 in the L knee with regular activity  NOT MET  Pt will demonstrate L knee and hip strength of at least 4+/5 for improved support of the knee with functional mobility  NOT MET  Pt will demonstrate L knee ROM WFL for improved positional tolerance and functional mobility  PARTIALLY MET  Pt will score no more than 29% impaired on FOTO to demonstrate overall functional improvement  NOT MET  Pt will be independent with comprehensive HEP  NOT MET      PLAN     Continue per Plan of Care    Jocelin Gómez, PT

## 2022-03-17 NOTE — PLAN OF CARE
[facility]   Physical Therapy Re-Evaluation    Date: 3/17/2022   Name: Vish Sun  Clinic Number: [id number]    Therapy Diagnosis:   Encounter Diagnoses   Name Primary?    Weakness of left lower extremity Yes    Decreased range of motion (ROM) of left knee      Referring Provider: Kerri Evangelista PA-C    Referring Provider Orders: PT eval and treat  Medical Diagnosis from Referral: Injury of left knee, initial encounter [S89.92XA], Quadriceps weakness [M62.81], Internal derangement of left knee [M23.92]  Evaluation Date: 2/17/2022  Last Reassessment: 3/17/2022  Authorization Period Expiration: 5/17/2022  Plan of Care Expiration: 6/17/2022  Progress Note Due: 5/17/2022  Visit # / Visits authorized: 6/20  FOTO: 2/3 (2/17/2022, 3/17/2022)  Missed Visits: 3/10/22      SUBJECTIVE     Patient states that his knee is a little better since starting physical therapy. Reports that his pain/discomfort that comes and goes. He cannot tolerate twisting motions or play basketball, and he notes continued discomfort with prolonged sitting for work.    OBJECTIVE     Limitation/Restriction for FOTO Knee Survey     Therapist reviewed FOTO scores for Vish Sun on 3/17/2022.   FOTO documents entered into [employer] - see Media section.     Limitation Score:   2/17/22: 49% impairment  3/17/22: 50% impairment      Transfers  2/17/22: decreased weightbearing through L leg with transferring sit to stand  3/17/22: even weight-bearing through both legs    Gait  2/17/22: mild L leg antalgia, slow amelia  3/17/22: slow amelia, no antalgia    Balance  2/17/22: unable to tolerate single-leg balance  3/17/22: able to perform 20 seconds of single-leg balance on L leg with mild sway, moderate fatigue       Lower Extremity Strength 2/17/2022 3/17/22   R hip flexion 4+/5    R hip external rotation 4+/5    R knee flexion 5/5    R knee extension 5/5    L hip flexion 4-/5 4+/5   L hip external rotation 4-/5 4/5   L knee  flexion 4-/5 4/5   L knee extension 3+/5 4-/5      Knee ROM 2/17/22 3/17/22   R extension WNL    R flexion WNL    L extension lacking 5° lacking 2°   L flexion 85° 120°       ASSESSMENT     Vish has progressed well with physical therapy, demonstrating improvements in pain levels, hip/core strength, balance, transfers, range of motion, and activity tolerance. Pt continues to experience pain/difficulty with ADLs and functional mobility due to L knee pain, weakness. Vish still experiences pain and difficulty with more dynamic movements, consistent with suspected meniscal tear, but this could improve with more progressive resistance exercise and functional retraining.   Vish has met/partially met some goals from the initial evaluation, and the rest of those goals remain appropriate for the plan of care.     Patient will benefit from skilled outpatient Physical Therapy to address the deficits stated above and in the chart below, provide patient /family education, and to maximize patient's level of independence.     Patient prognosis is Good.   Plan of care discussed with patient: yes  Patient's spiritual, cultural and educational needs considered and patient is agreeable to the plan of care and goals.  Anticipated Barriers for therapy: none      GOALS  Short Term Goals (4 weeks)  Pt will report pain of no more than 4/10 in the L knee with regular activity  PARTIALLY MET  Pt will demonstrate L knee and hip strength of at least 4/5 for improved support of the knee with functional mobility  PARTIALLY MET  Pt will demonstrate L knee extension range of motion deficit of no more than 2 degrees  MET  Pt will demonstrate L knee flexion range of motion of at least 100 degrees  MET  Pt will be independent with introductory HEP  PARTIALLY MET     Long Term Goals (8 weeks)  Pt will report pain of no more than 2/10 in the L knee with regular activity  NOT MET  Pt will demonstrate L knee and hip strength of at least 4+/5 for  improved support of the knee with functional mobility  NOT MET  Pt will demonstrate L knee ROM WFL for improved positional tolerance and functional mobility  PARTIALLY MET  Pt will score no more than 29% impaired on FOTO to demonstrate overall functional improvement  NOT MET  Pt will be independent with comprehensive HEP  NOT MET      PLAN     Plan of Care certification: 3/17/2022 to 6/17/2022    Outpatient Physical Therapy: 2 times weekly for 12 weeks to include the following interventions - Therapeutic Exercise, Manual Therapy, Therapeutic Activity, Neuromuscular Re-education, Electrical Stimulation (Unattended)    Jocelin Gómez, PT, DPT          I CERTIFY THE NEED FOR THESE SERVICES FURNISHED UNDER THIS PLAN OF TREATMENT AND WHILE UNDER MY CARE   Physician's comments:     Physician's Signature: ___________________________________________________

## 2022-03-22 ENCOUNTER — CLINICAL SUPPORT (OUTPATIENT)
Dept: REHABILITATION | Facility: HOSPITAL | Age: 37
End: 2022-03-22
Attending: PHYSICIAN ASSISTANT
Payer: MEDICAID

## 2022-03-22 DIAGNOSIS — M25.662 DECREASED RANGE OF MOTION (ROM) OF LEFT KNEE: ICD-10-CM

## 2022-03-22 DIAGNOSIS — R29.898 WEAKNESS OF LEFT LOWER EXTREMITY: Primary | ICD-10-CM

## 2022-03-22 PROCEDURE — 97110 THERAPEUTIC EXERCISES: CPT | Mod: PN,CQ

## 2022-03-22 NOTE — PROGRESS NOTES
[facility]   Physical Therapy Treatment Note     Name: Vish Sun  Clinic Number: [id number]    Therapy Diagnosis:   Encounter Diagnoses   Name Primary?    Weakness of left lower extremity Yes    Decreased range of motion (ROM) of left knee      Referring Provider: Kerri Evangelista PA-C    Visit Date: 3/22/2022    Referring Provider Orders: PT eval and treat  Medical Diagnosis from Referral: Injury of left knee, initial encounter [S89.92XA], Quadriceps weakness [M62.81], Internal derangement of left knee [M23.92]  Evaluation Date: 2/17/2022  Last Reassessment: 3/17/2022  Authorization Period Expiration: 5/17/2022  Plan of Care Expiration: 6/17/2022  Progress Note Due: 5/17/2022  Visit # / Visits authorized: 7/20  FOTO: 2/3 (2/17/2022, 3/17/2022)    PTA Visit #: 1/5     Time In: 1330  Time Out: 1415  Total Billable Time: 45 minutes    SUBJECTIVE     Patient reports that he played a little basketball but did avoid pivoting and lateral movements.  Patient reports that he is feeling better and not donning the knee brace as often.    He was somewhat compliant with home exercise program - he performs 15 minutes of exercises from therapy twice a week  Response to previous treatment: some increase with symptoms day of  Functional change: no reported change    Pain: 5/10  Location: L knee    OBJECTIVE     Objective Measures updated at progress report unless specified.         Treatment     Vish received the treatments listed below:      Therapeutic exercises to develop strength, endurance and core stabilization for 45 minutes -  [x]? SciFit stepper at level 6, 8 minutes  []? Short arc quad (5#, L), 3x10  [x] Straight leg raise (L), 3x10  [x]? Hooklying hip adduction isometric with ball, x30  []? Side-lying hip abduction (3#, L), 3x10   [] Supine clam with blue band, 3x10  [x] Bridging with belt around knees, 3x10  [] Prone hamstring curls (5#, L), 3x10  [x] Standing marches (5#, L), 3x10  [x]  Standing hamstring curl (5#, L), 3x10  [x] Side-stepping with green band around ankles, 3 minutes   [x] Standing heel raises (B), 3x15  [x] Standing calf stretch (B), 3x1 minute   [x] Sit to stand from chair + foam pad and with light hand support, 3x10  [] Lateral step up/down on 4-inch step (R&L), 3x15   [] Narrow base of support on foam pad with eyes closed, 30 seconds  [] Tandem balance on foam pad (R/L & L/R), 30 seconds  [] Single-leg balance (R&L), 3x20 seconds  [x] BOSU step up (L), 3x15  [] HEP review     Limitation/Restriction for FOTO Knee Survey     Therapist reviewed FOTO scores for Vish Sun on 3/17/2022.   FOTO documents entered into [employer] - see Media section.     Limitation Score:   2/17/22: 49% impairment  3/17/22: 50% impairment       Patient Education and Home Exercises     Education provided: Complete all exercise and acitivities of daily living in pain-free range, function of meniscus in dynamic leg movements    Written Home Exercises Provided: Patient instructed to cont prior HEP. Exercises were reviewed, and Vish was able to demonstrate them prior to the end of the session, as needed. Vish demonstrated good understanding of the education provided. See EMR under Patient Instructions for exercises provided during therapy sessions.    Home Exercise Program: hip adduction isometric with pillow, straight leg raise  Updated Home Exercise Program: straight leg raise (streamlined to improve compliance)    ASSESSMENT     Patient improving with tolerance to exercise and able to progress well with intensity and reps for exercise.  Patient improving with quad control.  Patient states that he feels better post treatment session.     Vish will continue to benefit from skilled outpatient physical therapy to address the deficits listed in the problem list box on initial evaluation, provide pt/family education and to maximize pt's level of independence in the home and community environment.     Vish smith  progressing well towards his goals.   Pt prognosis: Good  Pt's spiritual, cultural and educational needs considered, and pt agreeable to plan of care and goals.  Anticipated barriers to physical therapy: none    GOALS  Short Term Goals (4 weeks)  Pt will report pain of no more than 4/10 in the L knee with regular activity  PARTIALLY MET  Pt will demonstrate L knee and hip strength of at least 4/5 for improved support of the knee with functional mobility  PARTIALLY MET  Pt will demonstrate L knee extension range of motion deficit of no more than 2 degrees  MET  Pt will demonstrate L knee flexion range of motion of at least 100 degrees  MET  Pt will be independent with introductory HEP  PARTIALLY MET     Long Term Goals (8 weeks)  Pt will report pain of no more than 2/10 in the L knee with regular activity  NOT MET  Pt will demonstrate L knee and hip strength of at least 4+/5 for improved support of the knee with functional mobility  NOT MET  Pt will demonstrate L knee ROM WFL for improved positional tolerance and functional mobility  PARTIALLY MET  Pt will score no more than 29% impaired on FOTO to demonstrate overall functional improvement  NOT MET  Pt will be independent with comprehensive HEP  NOT MET      PLAN     Continue per Plan of Care    Varun Booth, TARYN

## 2022-03-24 ENCOUNTER — CLINICAL SUPPORT (OUTPATIENT)
Dept: REHABILITATION | Facility: HOSPITAL | Age: 37
End: 2022-03-24
Attending: PHYSICIAN ASSISTANT
Payer: MEDICAID

## 2022-03-24 DIAGNOSIS — M25.662 DECREASED RANGE OF MOTION (ROM) OF LEFT KNEE: ICD-10-CM

## 2022-03-24 DIAGNOSIS — R29.898 WEAKNESS OF LEFT LOWER EXTREMITY: Primary | ICD-10-CM

## 2022-03-24 PROCEDURE — 97110 THERAPEUTIC EXERCISES: CPT | Mod: PN,CQ

## 2022-03-24 NOTE — PROGRESS NOTES
[facility]   Physical Therapy Treatment Note     Name: Vish Sun  Clinic Number: [id number]    Therapy Diagnosis:   Encounter Diagnoses   Name Primary?    Weakness of left lower extremity Yes    Decreased range of motion (ROM) of left knee      Referring Provider: Kerri Evangelista PA-C    Visit Date: 3/24/2022    Referring Provider Orders: PT eval and treat  Medical Diagnosis from Referral: Injury of left knee, initial encounter [S89.92XA], Quadriceps weakness [M62.81], Internal derangement of left knee [M23.92]  Evaluation Date: 2/17/2022  Last Reassessment: 3/17/2022  Authorization Period Expiration: 5/17/2022  Plan of Care Expiration: 6/17/2022  Progress Note Due: 5/17/2022  Visit # / Visits authorized: 7/20  FOTO: 2/3 (2/17/2022, 3/17/2022)    PTA Visit #: 1/5     Time In: 1405  Time Out: 1450  Total Billable Time: 45 minutes    SUBJECTIVE     Patinet reports that he has some fatigue today that he attributes to not sleeping well the night before.    He was somewhat compliant with home exercise program - he performs 15 minutes of exercises from therapy twice a week  Response to previous treatment: no adverse reactions  Functional change: no reported change    Pain: 5/10  Location: L knee    OBJECTIVE     Objective Measures updated at progress report unless specified.         Treatment     Vish received the treatments listed below:      Therapeutic exercises to develop strength, endurance and core stabilization for 45 minutes -  [x]? SciFit stepper at level 6, 5 minutes  []? Short arc quad (5#, L), 3x10  [x] Straight leg raise (L),3lb 3x10  []? Hooklying hip adduction isometric with ball, x30  []? Side-lying hip abduction (3#, L), 3x10   [] Supine clam with blue band, 3x10  [x] Bridging with belt around knees, 3x10  [] Prone hamstring curls (5#, L), 3x10  [x] Standing marches (5#, L), 3x10  [x] Standing hamstring curl (5#, L), 3x10  [x] Side-stepping with blue band around knees, 3  laps  [] Standing heel raises (B), 3x15  [] Standing calf stretch (B), 3x1 minute   [x] Sit to stand from chair  3x10  [] Lateral step up/down on 4-inch step (R&L), 3x10  [] Narrow base of support on foam pad with eyes closed, 30 seconds  [] Tandem balance on foam pad (R/L & L/R), 30 seconds  [] Single-leg balance (R&L), 3x20 seconds  [x] BOSU step up (L), 3x15  [] HEP review       Patient Education and Home Exercises     Education provided: Complete all exercise and acitivities of daily living in pain-free range, function of meniscus in dynamic leg movements    Written Home Exercises Provided: Patient instructed to cont prior HEP. Exercises were reviewed, and Vish was able to demonstrate them prior to the end of the session, as needed. Vish demonstrated good understanding of the education provided. See EMR under Patient Instructions for exercises provided during therapy sessions.    Home Exercise Program: hip adduction isometric with pillow, straight leg raise  Updated Home Exercise Program: straight leg raise (streamlined to improve compliance)    ASSESSMENT     Patient continues to improve with tolerance to exercise and able to progress well with intensity and reps for exercise.  Patient improving with quad control.       Vish will continue to benefit from skilled outpatient physical therapy to address the deficits listed in the problem list box on initial evaluation, provide pt/family education and to maximize pt's level of independence in the home and community environment.     Vish is progressing well towards his goals.   Pt prognosis: Good  Pt's spiritual, cultural and educational needs considered, and pt agreeable to plan of care and goals.  Anticipated barriers to physical therapy: none    GOALS  Short Term Goals (4 weeks)  Pt will report pain of no more than 4/10 in the L knee with regular activity  PARTIALLY MET  Pt will demonstrate L knee and hip strength of at least 4/5 for improved support of the  knee with functional mobility  PARTIALLY MET  Pt will demonstrate L knee extension range of motion deficit of no more than 2 degrees  MET  Pt will demonstrate L knee flexion range of motion of at least 100 degrees  MET  Pt will be independent with introductory HEP  PARTIALLY MET     Long Term Goals (8 weeks)  Pt will report pain of no more than 2/10 in the L knee with regular activity  NOT MET  Pt will demonstrate L knee and hip strength of at least 4+/5 for improved support of the knee with functional mobility  NOT MET  Pt will demonstrate L knee ROM WFL for improved positional tolerance and functional mobility  PARTIALLY MET  Pt will score no more than 29% impaired on FOTO to demonstrate overall functional improvement  NOT MET  Pt will be independent with comprehensive HEP  NOT MET      PLAN     Continue per Plan of Care    Varun Booth, PTA

## 2022-03-29 ENCOUNTER — CLINICAL SUPPORT (OUTPATIENT)
Dept: REHABILITATION | Facility: HOSPITAL | Age: 37
End: 2022-03-29
Attending: PHYSICIAN ASSISTANT
Payer: MEDICAID

## 2022-03-29 DIAGNOSIS — M25.662 DECREASED RANGE OF MOTION (ROM) OF LEFT KNEE: ICD-10-CM

## 2022-03-29 DIAGNOSIS — R29.898 WEAKNESS OF LEFT LOWER EXTREMITY: Primary | ICD-10-CM

## 2022-03-29 PROCEDURE — 97110 THERAPEUTIC EXERCISES: CPT | Mod: PN,CQ

## 2022-03-29 NOTE — PROGRESS NOTES
[facility]   Physical Therapy Treatment Note     Name: Vish Sun  Clinic Number: [id number]    Therapy Diagnosis:   Encounter Diagnoses   Name Primary?    Weakness of left lower extremity Yes    Decreased range of motion (ROM) of left knee      Referring Provider: Kerri Evangelista PA-C    Visit Date: 3/29/2022    Referring Provider Orders: PT eval and treat  Medical Diagnosis from Referral: Injury of left knee, initial encounter [S89.92XA], Quadriceps weakness [M62.81], Internal derangement of left knee [M23.92]  Evaluation Date: 2/17/2022  Last Reassessment: 3/17/2022  Authorization Period Expiration: 5/17/2022  Plan of Care Expiration: 6/17/2022  Progress Note Due: 5/17/2022  Visit # / Visits authorized: 8/20  FOTO: 2/3 (2/17/2022, 3/17/2022)    PTA Visit #: 2/5     Time In: 1400  Time Out: 1445  Total Billable Time: 45 minutes    SUBJECTIVE     Patinet reports that he has had some increased symptoms between treatment sessions and has returned to donning knee brace.  Patient reports that he feels intermittent quad weakness and instability in the morning when waking up.    He was somewhat compliant with home exercise program - he performs 15 minutes of exercises from therapy twice a week  Response to previous treatment: no adverse reactions  Functional change: no reported change    Pain: 5/10  Location: L knee    OBJECTIVE     Objective Measures updated at progress report unless specified.         Treatment     Vish received the treatments listed below:      Therapeutic exercises to develop strength, endurance and core stabilization for 45 minutes -  [x]? SciFit stepper at level 6, 5 minutes  [x]? Short arc quad (5#, L), 3x10  [x] Straight leg raise with qs 2sec (L),3lb 3x10  [x]? Sidelying hip adduction 3x10  [x]? Side-lying hip abduction (3#, L), 3x10   [] Supine clam with blue band, 3x10  [x] Bridging with belt around knees, 3x10  [] Prone hamstring curls (5#, L), 3x10  [x]  Standing marches (5#, L), 3x10  [x] Standing hamstring curl (5#, L), 3x10  [x] Side-stepping with blue band around knees, 3 laps  [] Standing heel raises (B), 3x15  [] Standing calf stretch (B), 3x1 minute   [x] Sit to stand from chair  3x10  [] Lateral step up/down on 4-inch step (R&L), 3x10  [] Narrow base of support on foam pad with eyes closed, 30 seconds  [] Tandem balance on foam pad (R/L & L/R), 30 seconds  [] Single-leg balance (R&L), 3x20 seconds  [x] BOSU step up (L), 3x15  [x] Wall Squat with Green Ball and Blue Theraband 3x10       Patient Education and Home Exercises     Education provided: Complete all exercise and acitivities of daily living in pain-free range, function of meniscus in dynamic leg movements    Written Home Exercises Provided: Patient instructed to cont prior HEP. Exercises were reviewed, and Vish was able to demonstrate them prior to the end of the session, as needed. Vish demonstrated good understanding of the education provided. See EMR under Patient Instructions for exercises provided during therapy sessions.    Home Exercise Program: hip adduction isometric with pillow, straight leg raise  Updated Home Exercise Program: straight leg raise (streamlined to improve compliance)    ASSESSMENT     Good tolerance to exercise.  Patient able to incorporate wall squats with no reported pain or adverse reactions.       Vish will continue to benefit from skilled outpatient physical therapy to address the deficits listed in the problem list box on initial evaluation, provide pt/family education and to maximize pt's level of independence in the home and community environment.     Vish is progressing well towards his goals.   Pt prognosis: Good  Pt's spiritual, cultural and educational needs considered, and pt agreeable to plan of care and goals.  Anticipated barriers to physical therapy: none    GOALS  Short Term Goals (4 weeks)  Pt will report pain of no more than 4/10 in the L knee with  regular activity  PARTIALLY MET  Pt will demonstrate L knee and hip strength of at least 4/5 for improved support of the knee with functional mobility  PARTIALLY MET  Pt will demonstrate L knee extension range of motion deficit of no more than 2 degrees  MET  Pt will demonstrate L knee flexion range of motion of at least 100 degrees  MET  Pt will be independent with introductory HEP  PARTIALLY MET     Long Term Goals (8 weeks)  Pt will report pain of no more than 2/10 in the L knee with regular activity  NOT MET  Pt will demonstrate L knee and hip strength of at least 4+/5 for improved support of the knee with functional mobility  NOT MET  Pt will demonstrate L knee ROM WFL for improved positional tolerance and functional mobility  PARTIALLY MET  Pt will score no more than 29% impaired on FOTO to demonstrate overall functional improvement  NOT MET  Pt will be independent with comprehensive HEP  NOT MET      PLAN     Continue per Plan of Care    Varun Booth, TARYN

## 2022-03-31 ENCOUNTER — CLINICAL SUPPORT (OUTPATIENT)
Dept: REHABILITATION | Facility: HOSPITAL | Age: 37
End: 2022-03-31
Attending: PHYSICIAN ASSISTANT
Payer: MEDICAID

## 2022-03-31 DIAGNOSIS — R29.898 WEAKNESS OF LEFT LOWER EXTREMITY: Primary | ICD-10-CM

## 2022-03-31 DIAGNOSIS — M25.662 DECREASED RANGE OF MOTION (ROM) OF LEFT KNEE: ICD-10-CM

## 2022-03-31 PROCEDURE — 97110 THERAPEUTIC EXERCISES: CPT | Mod: PN,CQ

## 2022-03-31 PROCEDURE — 97140 MANUAL THERAPY 1/> REGIONS: CPT | Mod: PN,CQ

## 2022-03-31 NOTE — PROGRESS NOTES
[facility]   Physical Therapy Treatment Note     Name: Vish Sun  Clinic Number: [id number]    Therapy Diagnosis:   Encounter Diagnoses   Name Primary?    Weakness of left lower extremity Yes    Decreased range of motion (ROM) of left knee      Referring Provider: Kerri Evangelista PA-C    Visit Date: 3/31/2022    Referring Provider Orders: PT eval and treat  Medical Diagnosis from Referral: Injury of left knee, initial encounter [S89.92XA], Quadriceps weakness [M62.81], Internal derangement of left knee [M23.92]  Evaluation Date: 2/17/2022  Last Reassessment: 3/17/2022  Authorization Period Expiration: 5/17/2022  Plan of Care Expiration: 6/17/2022  Progress Note Due: 5/17/2022  Visit # / Visits authorized: 10/20  FOTO: 2/3 (2/17/2022, 3/17/2022)    PTA Visit #: 4/5     Time In: 1400  Time Out: 1445  Total Billable Time: 45 minutes    SUBJECTIVE     Patinet reports that he has had some increased symptoms between treatment sessions and was a 8/10 yesterday.  States he feels a little better today 6/10.    He was somewhat compliant with home exercise program - he performs 15 minutes of exercises from therapy twice a week  Response to previous treatment: no adverse reactions  Functional change: no reported change    Pain: 6/10  Location: L knee    OBJECTIVE     Objective Measures updated at progress report unless specified.         Treatment     Vish received the treatments listed below:      Therapeutic exercises to develop strength, endurance and core stabilization for 10 minutes -  [x]? Bike no resistance 5 min  [x]? Short arc quad (2#, L), 3x10  [x] Straight leg raise with qs 2sec (L), 3x10  [x]? Sidelying hip adduction 3x10  [x]? Side-lying hip abduction (, L), 3x10   [] Supine clam with blue band, 3x10  [x] Bridging with belt around knees, 3x10  [] Prone hamstring curls (5#, L), 3x10  [] Standing marches (5#, L), 3x10  [] Standing hamstring curl (5#, L), 3x10  [] Side-stepping with  blue band around knees, 3 laps  [] Standing heel raises (B), 3x15  [] Standing calf stretch (B), 3x1 minute   [] Sit to stand from chair  3x10  [] Lateral step up/down on 4-inch step (R&L), 3x10  [] Narrow base of support on foam pad with eyes closed, 30 seconds  [] Tandem balance on foam pad (R/L & L/R), 30 seconds  [] Single-leg balance (R&L), 3x20 seconds  [] BOSU step up (L), 3x15  [] Wall Squat with Green Ball and Blue Theraband 3x10     Manual Therapy to the left knee for 30 minutes including:    passive range of motion knee and hip flexion  Patella glides   STM to distal patella tendon   STM to quads, IT Band and medial glute      Patient Education and Home Exercises     Education provided: Complete all exercise and acitivities of daily living in pain-free range, function of meniscus in dynamic leg movements    Written Home Exercises Provided: Patient instructed to cont prior HEP. Exercises were reviewed, and Vish was able to demonstrate them prior to the end of the session, as needed. Vish demonstrated good understanding of the education provided. See EMR under Patient Instructions for exercises provided during therapy sessions.    Home Exercise Program: hip adduction isometric with pillow, straight leg raise  Updated Home Exercise Program: straight leg raise (streamlined to improve compliance)    ASSESSMENT     Good tolerance to exercise.  Patient able to incorporate wall squats with no reported pain or adverse reactions.  Focused on MT to help reduce knee pain.  patient did have increased IT Band tightness. Patient reports that his knee felt better and not as tight post treatment session.     Vish will continue to benefit from skilled outpatient physical therapy to address the deficits listed in the problem list box on initial evaluation, provide pt/family education and to maximize pt's level of independence in the home and community environment.     Vish is progressing well towards his goals.   Pt  prognosis: Good  Pt's spiritual, cultural and educational needs considered, and pt agreeable to plan of care and goals.  Anticipated barriers to physical therapy: none    GOALS  Short Term Goals (4 weeks)  Pt will report pain of no more than 4/10 in the L knee with regular activity  PARTIALLY MET  Pt will demonstrate L knee and hip strength of at least 4/5 for improved support of the knee with functional mobility  PARTIALLY MET  Pt will demonstrate L knee extension range of motion deficit of no more than 2 degrees  MET  Pt will demonstrate L knee flexion range of motion of at least 100 degrees  MET  Pt will be independent with introductory HEP  PARTIALLY MET     Long Term Goals (8 weeks)  Pt will report pain of no more than 2/10 in the L knee with regular activity  NOT MET  Pt will demonstrate L knee and hip strength of at least 4+/5 for improved support of the knee with functional mobility  NOT MET  Pt will demonstrate L knee ROM WFL for improved positional tolerance and functional mobility  PARTIALLY MET  Pt will score no more than 29% impaired on FOTO to demonstrate overall functional improvement  NOT MET  Pt will be independent with comprehensive HEP  NOT MET      PLAN     Continue per Plan of Care    Varun Booth, TARYN

## 2022-04-28 ENCOUNTER — DOCUMENTATION ONLY (OUTPATIENT)
Dept: REHABILITATION | Facility: HOSPITAL | Age: 37
End: 2022-04-28
Payer: MEDICAID

## 2022-04-28 DIAGNOSIS — R29.898 WEAKNESS OF LEFT LOWER EXTREMITY: Primary | ICD-10-CM

## 2022-04-28 DIAGNOSIS — M25.662 DECREASED RANGE OF MOTION (ROM) OF LEFT KNEE: ICD-10-CM

## 2022-04-28 NOTE — PROGRESS NOTES
[facility]   Physical Therapy Discharge Summary    Date: 4/28/2022   Name: Vish Sun  Clinic Number: [id number]    Therapy Diagnosis:   Encounter Diagnoses   Name Primary?    Weakness of left lower extremity Yes    Decreased range of motion (ROM) of left knee      Referring Provider: Kerri Evangelista PA-C    Initial Visit: 2/17/22  Date of Last Visit: 3/31/22  Total Visits Received: 10  Missed Visits: 3      Assessment      Pt did not return to therapy after his last visit on 3/31/22. Unable to formally reassess and determine progress towards goals, but pt had been reporting improved symptoms during treatment sessions. See Progress Report/Re-evaluation from 3/17/22 for most recent objective measures.    Discharge reason: Pt has not re-scheduled further follow-up sessions  Discharge plan: Continue HEP    Plan      This patient is discharged from Physical Therapy Services.       Jocelin Gómez, PT, DPT

## 2022-11-19 ENCOUNTER — HOSPITAL ENCOUNTER (EMERGENCY)
Facility: OTHER | Age: 37
Discharge: HOME | End: 2022-11-19
Attending: EMERGENCY MEDICINE
Payer: MEDICAID

## 2022-11-19 VITALS
OXYGEN SATURATION: 98 % | BODY MASS INDEX: 19.64 KG/M2 | SYSTOLIC BLOOD PRESSURE: 150 MMHG | DIASTOLIC BLOOD PRESSURE: 88 MMHG | HEIGHT: 72 IN | WEIGHT: 145 LBS | RESPIRATION RATE: 18 BRPM | TEMPERATURE: 98 F | HEART RATE: 58 BPM

## 2022-11-19 DIAGNOSIS — R07.89 CHEST WALL PAIN: Primary | ICD-10-CM

## 2022-11-19 DIAGNOSIS — R07.9 CHEST PAIN: ICD-10-CM

## 2022-11-19 LAB
ANION GAP SERPL CALC-SCNC: 7 MMOL/L (ref 8–16)
BASOPHILS # BLD AUTO: 0.02 K/UL (ref 0–0.2)
BASOPHILS NFR BLD: 0.2 % (ref 0–1.9)
BUN SERPL-MCNC: 8 MG/DL (ref 6–20)
CALCIUM SERPL-MCNC: 9.2 MG/DL (ref 8.7–10.5)
CHLORIDE SERPL-SCNC: 107 MMOL/L (ref 95–110)
CO2 SERPL-SCNC: 26 MMOL/L (ref 23–29)
CREAT SERPL-MCNC: 0.8 MG/DL (ref 0.5–1.4)
DIFFERENTIAL METHOD: ABNORMAL
EOSINOPHIL # BLD AUTO: 0.2 K/UL (ref 0–0.5)
EOSINOPHIL NFR BLD: 1.7 % (ref 0–8)
ERYTHROCYTE [DISTWIDTH] IN BLOOD BY AUTOMATED COUNT: 11.6 % (ref 11.5–14.5)
EST. GFR  (NO RACE VARIABLE): >60 ML/MIN/1.73 M^2
GLUCOSE SERPL-MCNC: 74 MG/DL (ref 70–110)
HCT VFR BLD AUTO: 40.7 % (ref 40–54)
HCV AB SERPL QL IA: NEGATIVE
HCV AB SERPL QL IA: NEGATIVE
HGB BLD-MCNC: 13.5 G/DL (ref 14–18)
HIV 1+2 AB+HIV1 P24 AG SERPL QL IA: NEGATIVE
HIV 1+2 AB+HIV1 P24 AG SERPL QL IA: NEGATIVE
IMM GRANULOCYTES # BLD AUTO: 0.02 K/UL (ref 0–0.04)
IMM GRANULOCYTES NFR BLD AUTO: 0.2 % (ref 0–0.5)
LYMPHOCYTES # BLD AUTO: 2.5 K/UL (ref 1–4.8)
LYMPHOCYTES NFR BLD: 27.7 % (ref 18–48)
MCH RBC QN AUTO: 33.8 PG (ref 27–31)
MCHC RBC AUTO-ENTMCNC: 33.2 G/DL (ref 32–36)
MCV RBC AUTO: 102 FL (ref 82–98)
MONOCYTES # BLD AUTO: 0.5 K/UL (ref 0.3–1)
MONOCYTES NFR BLD: 5.7 % (ref 4–15)
NEUTROPHILS # BLD AUTO: 5.7 K/UL (ref 1.8–7.7)
NEUTROPHILS NFR BLD: 64.5 % (ref 38–73)
NRBC BLD-RTO: 0 /100 WBC
PLATELET # BLD AUTO: 271 K/UL (ref 150–450)
PLATELET BLD QL SMEAR: ABNORMAL
PMV BLD AUTO: 9.7 FL (ref 9.2–12.9)
POTASSIUM SERPL-SCNC: 4.3 MMOL/L (ref 3.5–5.1)
RBC # BLD AUTO: 3.99 M/UL (ref 4.6–6.2)
SODIUM SERPL-SCNC: 140 MMOL/L (ref 136–145)
TROPONIN I SERPL DL<=0.01 NG/ML-MCNC: <0.006 NG/ML (ref 0–0.03)
WBC # BLD AUTO: 8.88 K/UL (ref 3.9–12.7)

## 2022-11-19 PROCEDURE — 99285 EMERGENCY DEPT VISIT HI MDM: CPT | Mod: 25

## 2022-11-19 PROCEDURE — 80048 BASIC METABOLIC PNL TOTAL CA: CPT | Performed by: EMERGENCY MEDICINE

## 2022-11-19 PROCEDURE — 93010 EKG 12-LEAD: ICD-10-PCS | Mod: ,,, | Performed by: INTERNAL MEDICINE

## 2022-11-19 PROCEDURE — 87389 HIV-1 AG W/HIV-1&-2 AB AG IA: CPT | Performed by: EMERGENCY MEDICINE

## 2022-11-19 PROCEDURE — 63600175 PHARM REV CODE 636 W HCPCS: Performed by: EMERGENCY MEDICINE

## 2022-11-19 PROCEDURE — 85025 COMPLETE CBC W/AUTO DIFF WBC: CPT | Performed by: EMERGENCY MEDICINE

## 2022-11-19 PROCEDURE — 96374 THER/PROPH/DIAG INJ IV PUSH: CPT

## 2022-11-19 PROCEDURE — 93010 ELECTROCARDIOGRAM REPORT: CPT | Mod: ,,, | Performed by: INTERNAL MEDICINE

## 2022-11-19 PROCEDURE — 93005 ELECTROCARDIOGRAM TRACING: CPT

## 2022-11-19 PROCEDURE — 84484 ASSAY OF TROPONIN QUANT: CPT | Performed by: EMERGENCY MEDICINE

## 2022-11-19 PROCEDURE — 86803 HEPATITIS C AB TEST: CPT | Performed by: EMERGENCY MEDICINE

## 2022-11-19 RX ORDER — KETOROLAC TROMETHAMINE 30 MG/ML
10 INJECTION, SOLUTION INTRAMUSCULAR; INTRAVENOUS
Status: COMPLETED | OUTPATIENT
Start: 2022-11-19 | End: 2022-11-19

## 2022-11-19 RX ADMIN — KETOROLAC TROMETHAMINE 10 MG: 30 INJECTION, SOLUTION INTRAMUSCULAR; INTRAVENOUS at 06:11

## 2022-11-19 NOTE — ED PROVIDER NOTES
Encounter Date: 11/19/2022    SCRIBE #1 NOTE: I, Nico Kuhn, am scribing for, and in the presence of,  Nathalie Whitman MD. I have scribed the following portions of the note - Other sections scribed: HPI, ROS, PE, CXR, EKG.     History     Chief Complaint   Patient presents with    Chest Pain     C/o continuous left chest pain 7/10 non radiating that began today. -SOB . Denies any other symptoms. VSS     Time seen by provider: 5:31 PM    This is a 37 y.o. male who presents with complaint of constant left lower chest pains starting 5.5 hours ago. He describes this as an aching pain. Patient states that he was sitting in a car going through the [place] [facility] when this began. 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  Patient denies any fever, chills, sweating, congestion, cough, nausea, or vomiting. He does not endorse any cardiopulmonary history. This is the extent of the patient's complaints at this time.    The history is provided by the patient.   Review of patient's allergies indicates:   Allergen Reactions    Ibuprofen Other (See Comments)     Indigestion/gi upset       History reviewed. No pertinent past medical history.  History reviewed. No pertinent surgical history.  History reviewed. No pertinent family history.  Social History     Tobacco Use    Smoking status: Every Day     Packs/day: 0.50     Types: Cigarettes    Smokeless tobacco: Never   Substance Use Topics    Alcohol use: Yes     Comment: occ    Drug use: Yes     Frequency: 7.0 times per week     Types: Marijuana     Review of Systems   Constitutional:  Negative for chills, diaphoresis and fever.   HENT:  Negative for sore throat.    Respiratory:  Negative for shortness of breath.    Cardiovascular:  Positive for chest pain.   Gastrointestinal:  Negative for nausea and vomiting.   Genitourinary:  Negative for dysuria.   Musculoskeletal:  Negative  for back pain.   Skin:  Negative for rash.   Neurological:  Negative for weakness.   Hematological:  Does not bruise/bleed easily.     Physical Exam     Initial Vitals [11/19/22 1556]   BP Pulse Resp Temp SpO2   129/75 79 17 98.8 °F (37.1 °C) 97 %      MAP       --         Physical Exam    Nursing note and vitals reviewed.  Constitutional: He appears well-developed and well-nourished. He is not diaphoretic. No distress.   HENT:   Head: Normocephalic and atraumatic.   Eyes: Conjunctivae are normal. No scleral icterus.   Neck: No JVD present.   Normal range of motion.  Cardiovascular:  Normal rate, regular rhythm and normal heart sounds.     Exam reveals no gallop and no friction rub.       No murmur heard.  Pulmonary/Chest: Effort normal and breath sounds normal. No tachypnea. No respiratory distress. He has no wheezes. He has no rhonchi. He has no rales.   Reproducible tenderness to the left upper chest wall.   Musculoskeletal:         General: No tenderness or edema. Normal range of motion.      Cervical back: Normal range of motion.      Comments: No peripheral edema.     Neurological: He is alert and oriented to person, place, and time.   Skin: Skin is warm. Capillary refill takes less than 2 seconds. No rash noted. No erythema.   Psychiatric: He has a normal mood and affect. His behavior is normal.       ED Course   Procedures  Labs Reviewed   CBC W/ AUTO DIFFERENTIAL - Abnormal; Notable for the following components:       Result Value    RBC 3.99 (*)     Hemoglobin 13.5 (*)      (*)     MCH 33.8 (*)     All other components within normal limits    Narrative:     Release to patient->Immediate   BASIC METABOLIC PANEL - Abnormal; Notable for the following components:    Anion Gap 7 (*)     All other components within normal limits    Narrative:     Release to patient->Immediate   HIV 1 / 2 ANTIBODY    Narrative:     Release to patient->Immediate   HEPATITIS C ANTIBODY    Narrative:     Release to  patient->Immediate   HIV 1 / 2 ANTIBODY    Narrative:     Release to patient->Immediate   HEPATITIS C ANTIBODY    Narrative:     Release to patient->Immediate   TROPONIN I    Narrative:     Release to patient->Immediate     EKG Readings: (Independently Interpreted)   EKG:  Sinus rhythm at 57, normal axis, no ischemic changes     Imaging Results              X-Ray Chest PA And Lateral (Final result)  Result time 11/19/22 19:02:59      Final result by Dar Garduno MD (11/19/22 19:02:59)                   Impression:      No acute abnormality.      Electronically signed by: Dar Garduno  Date:    11/19/2022  Time:    19:02               Narrative:    EXAMINATION:  XR CHEST PA AND LATERAL    CLINICAL HISTORY:  chest pain;    TECHNIQUE:  PA and lateral views of the chest were performed.    COMPARISON:  None    FINDINGS:  The lungs are clear, with normal appearance of pulmonary vasculature and no pleural effusion or pneumothorax.    The cardiac silhouette is normal in size. The hilar and mediastinal contours are unremarkable.    Bones are intact.                                       Medications   ketorolac injection 9.999 mg (9.999 mg Intravenous Given 11/19/22 1800)     Medical Decision Making:   History:   Old Medical Records: I decided to obtain old medical records.  Initial Assessment:   Urgent evaluation of 37-year-old male presenting today with chest pain located in the anterior chest the past several hours.  No associated symptoms of shortness of breath, dizziness or lightheadedness.  Patient overall is well-appearing, no acute distress.  Pain is reproducible exam.  Vital signs stable.  No cardiac history.  Differential Diagnosis:   Musculoskeletal pain, muscle strain, costochondritis, low suspicion for ACS, pneumonia  Clinical Tests:   Lab Tests: Ordered and Reviewed  Radiological Study: Ordered and Reviewed  Medical Tests: Ordered and Reviewed  ED Management:  Plan for Toradol IV, labs, chest x-ray, EKG         Scribe Attestation:   Scribe #1: I performed the above scribed service and the documentation accurately describes the services I performed. I attest to the accuracy of the note.      ED Course as of 11/19/22 1907   Sat Nov 19, 2022 1822 Troponin I: <0.006 [GM]   1822 WBC: 8.88 [GM]   1822 BUN: 8 [GM]   1822 Creatinine: 0.8 [GM]   1906 Chest x-ray reviewed with no acute process. [GM]   1906 I have low suspicion for cardiac etiology at this time given the reproducible nature of his ches pain, negative troponin and normal EKG.  I recommended Tylenol/ibuprofen as needed for pain.  Return to ED for worsening symptoms. [GM]      ED Course User Index  [GM] Nathalie Whitman MD          Physician Attestation for Scribe: ILEIDY MD, reviewed documentation as scribed in my presence, which is both accurate and complete.         Clinical Impression:   Final diagnoses:  [R07.9] Chest pain  [R07.89] Chest wall pain (Primary)        ED Disposition Condition    Discharge Stable          ED Prescriptions    None       Follow-up Information       Follow up With Specialties Details Why Contact Info    [facility] - Emergency Dept Emergency Medicine  If symptoms worsen [address] [facility] [id number] [phone]             Nathalie Whitman MD  11/19/22 1907

## 2022-11-19 NOTE — ED TRIAGE NOTES
Pt reports to ed with co left sided chest pain starting today. Denies radiation, -sob. Rated 7/10. Aaox4. vss